# Patient Record
Sex: MALE | Race: WHITE | NOT HISPANIC OR LATINO | Employment: PART TIME | URBAN - METROPOLITAN AREA
[De-identification: names, ages, dates, MRNs, and addresses within clinical notes are randomized per-mention and may not be internally consistent; named-entity substitution may affect disease eponyms.]

---

## 2024-05-08 ENCOUNTER — OFFICE VISIT (OUTPATIENT)
Dept: DERMATOLOGY | Facility: IMAGING CENTER | Age: 29
End: 2024-05-08
Payer: COMMERCIAL

## 2024-05-08 DIAGNOSIS — Z79.899 HIGH RISK MEDICATION USE: ICD-10-CM

## 2024-05-08 DIAGNOSIS — L20.84 INTRINSIC ATOPIC DERMATITIS: ICD-10-CM

## 2024-05-08 PROCEDURE — 99204 OFFICE O/P NEW MOD 45 MIN: CPT | Performed by: NURSE PRACTITIONER

## 2024-05-08 RX ORDER — MONTELUKAST SODIUM 10 MG/1
TABLET ORAL
COMMUNITY
Start: 2024-04-16

## 2024-05-08 RX ORDER — TACROLIMUS 1 MG/G
OINTMENT TOPICAL
Qty: 100 G | Refills: 3 | Status: SHIPPED | OUTPATIENT
Start: 2024-05-08

## 2024-05-08 RX ORDER — TRIAMCINOLONE ACETONIDE 1 MG/G
CREAM TOPICAL
Qty: 454 G | Refills: 3 | Status: SHIPPED | OUTPATIENT
Start: 2024-05-08

## 2024-05-08 NOTE — PROGRESS NOTES
DERMATOLOGY NOTE  NEW VISIT       Chief complaint: Establish Care     HPI:Hx Eczema  Onset:   Symptoms: Redness,swelling, itchy  Aggravating factors: No  Alleviating factors: Had a shot of Kenalog at Urgent Care 03/20/2024  Treatments: Has had 3 Emergency Kenalog Shots in the last few years  Had a dermatologist before and just had topical treatments. Has had hydrocortisone shots on scalp.  Pt would like to get a better treatment to alleviate the symptoms  Haven't been on Biologics in the past, just steroid.        Not on File     MEDICATIONS:  Medications relevant to specialty reviewed.     REVIEW OF SYSTEMS:   Positive for skin (see HPI)  Negative for fevers and chills       EXAM:  There were no vitals taken for this visit.  Constitutional: Well-developed, well-nourished, and in no distress.     A focused skin exam was performed including the affected areas of the face, trunk, extremities. Notable findings on exam today listed below and/or in assessment/plan.     Scattered morbiliform papules and patches to all areas assessed with associated periorbital edema    IMPRESSION / PLAN:    1. Intrinsic atopic dermatitis, severe  Pt understands the course and nature of disease, has associated allergies and asthma  No significant improvement with TCS/non steroidal anti-inflammatories, currently on singulair and second generation antihistamine  Understands the importance of supportive measures, emollient use, dry skin handouts given  Has had to have IM kenalog in the past few months  Pt would benefit from biologic, PA started on Dupixent  Discussed with pt AEs, risks and benefits, labs ordered  May need to consider referral to allergist for testing  Rx's below  Close follow up 6 weeks    - triamcinolone acetonide (KENALOG) 0.1 % Cream; AAA twice a day for 1-2 weeks at a time with 1-2 week break in between  Dispense: 454 g; Refill: 3  - tacrolimus (PROTOPIC) 0.1 % Ointment; AAA twice a day  Dispense: 100 g; Refill: 3    2.  High risk medication use    - Quantiferon Gold TB (PPD); Future  - CBC WITH DIFFERENTIAL; Future      Discussed risks, benefits, alternative treatments as well as common side effects associated with prescribed treatment, Patient verbalized understanding and agrees with plan regarding the above        Please note that this dictation was created using voice recognition software. I have made every reasonable attempt to correct obvious errors, but I expect that there are errors of grammar and possibly content that I did not discover before finalizing the note.      Return to clinic in: Return in about 6 weeks (around 6/19/2024) for Eczema follow up. and as needed for any new or changing skin lesions.

## 2024-05-10 ENCOUNTER — HOSPITAL ENCOUNTER (OUTPATIENT)
Dept: LAB | Facility: MEDICAL CENTER | Age: 29
End: 2024-05-10
Attending: NURSE PRACTITIONER
Payer: COMMERCIAL

## 2024-05-10 ENCOUNTER — TELEPHONE (OUTPATIENT)
Dept: DERMATOLOGY | Facility: IMAGING CENTER | Age: 29
End: 2024-05-10
Payer: COMMERCIAL

## 2024-05-10 DIAGNOSIS — Z79.899 HIGH RISK MEDICATION USE: ICD-10-CM

## 2024-05-10 LAB
BASOPHILS # BLD AUTO: 1.2 % (ref 0–1.8)
BASOPHILS # BLD: 0.1 K/UL (ref 0–0.12)
EOSINOPHIL # BLD AUTO: 0.94 K/UL (ref 0–0.51)
EOSINOPHIL NFR BLD: 11.4 % (ref 0–6.9)
ERYTHROCYTE [DISTWIDTH] IN BLOOD BY AUTOMATED COUNT: 42.9 FL (ref 35.9–50)
HCT VFR BLD AUTO: 51.3 % (ref 42–52)
HGB BLD-MCNC: 17.2 G/DL (ref 14–18)
IMM GRANULOCYTES # BLD AUTO: 0.04 K/UL (ref 0–0.11)
IMM GRANULOCYTES NFR BLD AUTO: 0.5 % (ref 0–0.9)
LYMPHOCYTES # BLD AUTO: 2.13 K/UL (ref 1–4.8)
LYMPHOCYTES NFR BLD: 25.9 % (ref 22–41)
MCH RBC QN AUTO: 30.3 PG (ref 27–33)
MCHC RBC AUTO-ENTMCNC: 33.5 G/DL (ref 32.3–36.5)
MCV RBC AUTO: 90.3 FL (ref 81.4–97.8)
MONOCYTES # BLD AUTO: 0.78 K/UL (ref 0–0.85)
MONOCYTES NFR BLD AUTO: 9.5 % (ref 0–13.4)
NEUTROPHILS # BLD AUTO: 4.23 K/UL (ref 1.82–7.42)
NEUTROPHILS NFR BLD: 51.5 % (ref 44–72)
NRBC # BLD AUTO: 0 K/UL
NRBC BLD-RTO: 0 /100 WBC (ref 0–0.2)
PLATELET # BLD AUTO: 280 K/UL (ref 164–446)
PMV BLD AUTO: 9.7 FL (ref 9–12.9)
RBC # BLD AUTO: 5.68 M/UL (ref 4.7–6.1)
WBC # BLD AUTO: 8.2 K/UL (ref 4.8–10.8)

## 2024-05-10 NOTE — TELEPHONE ENCOUNTER
Prior authorization started through cover my meds on Tacrolimus .status pending awaiting response

## 2024-05-13 ENCOUNTER — TELEPHONE (OUTPATIENT)
Dept: DERMATOLOGY | Facility: IMAGING CENTER | Age: 29
End: 2024-05-13
Payer: COMMERCIAL

## 2024-05-13 LAB
GAMMA INTERFERON BACKGROUND BLD IA-ACNC: 0.03 IU/ML
M TB IFN-G BLD-IMP: NEGATIVE
M TB IFN-G CD4+ BCKGRND COR BLD-ACNC: 0 IU/ML
MITOGEN IGNF BCKGRD COR BLD-ACNC: >10 IU/ML
QFT TB2 - NIL TBQ2: 0 IU/ML

## 2024-05-13 NOTE — TELEPHONE ENCOUNTER
Prior authorization has been started for Dupixent through [facility] specialty pharmacy. Spoke to Farzaneh ZENG All clinical notes have been faxed . Status pending , awaiting response .  PA #  [id number]     Fax #  [phone]

## 2024-05-14 ENCOUNTER — TELEPHONE (OUTPATIENT)
Dept: DERMATOLOGY | Facility: IMAGING CENTER | Age: 29
End: 2024-05-14
Payer: COMMERCIAL

## 2024-05-14 DIAGNOSIS — L20.84 INTRINSIC ATOPIC DERMATITIS: ICD-10-CM

## 2024-05-14 NOTE — TELEPHONE ENCOUNTER
[facility] denied dupixent stating patient has not tried and failed very high potency corticosteroid on the skin in the past 180 days . I scanned insurance denial in media tab

## 2024-05-15 NOTE — TELEPHONE ENCOUNTER
Rx was denied , all information scanned in media tab. Priscila Luong and patient has been informed.

## 2024-05-15 NOTE — TELEPHONE ENCOUNTER
Left message with wife herrera , ok per patient as she is emergency contact . They will  new Rx and call us in about two weeks time to give an up date on how he is doing .

## 2024-05-28 DIAGNOSIS — J45.909 ASTHMA, UNSPECIFIED ASTHMA SEVERITY, UNSPECIFIED WHETHER COMPLICATED, UNSPECIFIED WHETHER PERSISTENT: ICD-10-CM

## 2024-05-28 RX ORDER — ALBUTEROL SULFATE 90 UG/1
1-2 AEROSOL, METERED RESPIRATORY (INHALATION) EVERY 6 HOURS PRN
Qty: 1 EACH | Refills: 2 | Status: SHIPPED | OUTPATIENT
Start: 2024-05-28

## 2024-06-19 ENCOUNTER — TELEPHONE (OUTPATIENT)
Dept: DERMATOLOGY | Facility: IMAGING CENTER | Age: 29
End: 2024-06-19

## 2024-06-19 ENCOUNTER — OFFICE VISIT (OUTPATIENT)
Dept: DERMATOLOGY | Facility: IMAGING CENTER | Age: 29
End: 2024-06-19
Payer: COMMERCIAL

## 2024-06-19 DIAGNOSIS — L20.84 INTRINSIC ATOPIC DERMATITIS: ICD-10-CM

## 2024-06-19 PROCEDURE — 99213 OFFICE O/P EST LOW 20 MIN: CPT | Performed by: NURSE PRACTITIONER

## 2024-06-19 RX ORDER — TACROLIMUS 1 MG/G
OINTMENT TOPICAL
Qty: 100 G | Refills: 3 | Status: SHIPPED | OUTPATIENT
Start: 2024-06-19

## 2024-06-19 NOTE — TELEPHONE ENCOUNTER
Pa initiated for Dupixent through CoverMyMeds / was sent to [facility]. Waiting for response.

## 2024-06-19 NOTE — PROGRESS NOTES
DERMATOLOGY NOTE  Follow up  VISIT       Chief complaint: Follow-Up       Discuss  starting Dupixent, still continues get break outs all over body, no significant improvement with fluocinonide, TAC, triamcinolone injections, max dose of antihistamines and montelukast       HPI:Hx Eczema  Onset:   Symptoms: Redness,swelling, itchy  Aggravating factors: No  Alleviating factors: Had a shot of Kenalog at Urgent Care 03/20/2024  Treatments: Has had 3 Emergency Kenalog Shots in the last few years  Had a dermatologist before and just had topical treatments. Has had hydrocortisone shots on scalp.  Pt would like to get a better treatment to alleviate the symptoms  Haven't been on Biologics in the past, just steroid.        Not on File     MEDICATIONS:  Medications relevant to specialty reviewed.     REVIEW OF SYSTEMS:   Positive for skin (see HPI)  Negative for fevers and chills       EXAM:  There were no vitals taken for this visit.  Constitutional: Well-developed, well-nourished, and in no distress.     A focused skin exam was performed including the affected areas of the face, trunk, extremities. Notable findings on exam today listed below and/or in assessment/plan.     Scattered morbiliform papules and patches to all areas assessed with associated periorbital edema    IMPRESSION / PLAN:    1. Intrinsic atopic dermatitis, severe  Again, pt understands the course and nature of disease, has associated allergies and asthma  No significant improvement with TCS/non steroidal anti-inflammatories--fluocinonide and TAC, montelukast and max doses of second generation antihistamine  Understands the importance of supportive measures, emollient use, dry skin handouts given  Has had to have IM kenalog in the past few months, no significant improvement  Will resubmit PA for Dupixent, advised to continue with current regimen until outcome of second PA  Rx below, will send to alternate pharmacy due to cost    - tacrolimus (PROTOPIC) 0.1  % Ointment; AAA twice a day  Dispense: 100 g; Refill: 3    Discussed risks, benefits, alternative treatments as well as common side effects associated with prescribed treatment, Patient verbalized understanding and agrees with plan regarding the above        Please note that this dictation was created using voice recognition software. I have made every reasonable attempt to correct obvious errors, but I expect that there are errors of grammar and possibly content that I did not discover before finalizing the note.      Return to clinic in: Return for pending re-submission of PA for dupixent. and as needed for any new or changing skin lesions.

## 2024-06-21 NOTE — TELEPHONE ENCOUNTER
PA for Dupixent approved. Spoked to Vida from PA Department and she stated that both automated pen and pre-filled syringe are approved. Letter scanned in media.

## 2024-06-24 DIAGNOSIS — L20.84 INTRINSIC ATOPIC DERMATITIS: ICD-10-CM

## 2024-06-25 DIAGNOSIS — L20.84 INTRINSIC ATOPIC DERMATITIS: ICD-10-CM

## 2024-08-09 DIAGNOSIS — J45.909 ASTHMA, UNSPECIFIED ASTHMA SEVERITY, UNSPECIFIED WHETHER COMPLICATED, UNSPECIFIED WHETHER PERSISTENT: ICD-10-CM

## 2024-08-09 NOTE — TELEPHONE ENCOUNTER
ALBUTEROL HFA (PROVENTIL) INH   Received request via: Pharmacy    Was the patient seen in the last year in this department? Yes    Does the patient have an active prescription (recently filled or refills available) for medication(s) requested? No    Pharmacy Name: [facility]/pharmacy #[id number] - [place], [place] - [address] in [place]     Does the patient have shelter Plus and need 100-day supply? (This applies to ALL medications) Patient does not have SCP

## 2024-08-13 RX ORDER — ALBUTEROL SULFATE 90 UG/1
AEROSOL, METERED RESPIRATORY (INHALATION)
Qty: 6.7 EACH | Refills: 2 | Status: SHIPPED | OUTPATIENT
Start: 2024-08-13

## 2024-09-14 ENCOUNTER — OFFICE VISIT (OUTPATIENT)
Dept: URGENT CARE | Facility: CLINIC | Age: 29
End: 2024-09-14
Payer: COMMERCIAL

## 2024-09-14 ENCOUNTER — APPOINTMENT (OUTPATIENT)
Dept: RADIOLOGY | Facility: IMAGING CENTER | Age: 29
End: 2024-09-14
Payer: COMMERCIAL

## 2024-09-14 VITALS
TEMPERATURE: 98.3 F | OXYGEN SATURATION: 94 % | HEART RATE: 88 BPM | DIASTOLIC BLOOD PRESSURE: 70 MMHG | WEIGHT: 200.3 LBS | SYSTOLIC BLOOD PRESSURE: 108 MMHG | HEIGHT: 70 IN | BODY MASS INDEX: 28.67 KG/M2 | RESPIRATION RATE: 20 BRPM

## 2024-09-14 DIAGNOSIS — J45.41 MODERATE PERSISTENT ASTHMA WITH ACUTE EXACERBATION: Primary | ICD-10-CM

## 2024-09-14 DIAGNOSIS — Z00.00 HEALTHCARE MAINTENANCE: ICD-10-CM

## 2024-09-14 DIAGNOSIS — J06.9 VIRAL URI WITH COUGH: ICD-10-CM

## 2024-09-14 LAB
FLUAV RNA SPEC QL NAA+PROBE: NEGATIVE
FLUBV RNA SPEC QL NAA+PROBE: NEGATIVE
RSV RNA SPEC QL NAA+PROBE: NEGATIVE
SARS-COV-2 RNA RESP QL NAA+PROBE: NEGATIVE

## 2024-09-14 PROCEDURE — 94640 AIRWAY INHALATION TREATMENT: CPT

## 2024-09-14 PROCEDURE — 3074F SYST BP LT 130 MM HG: CPT

## 2024-09-14 PROCEDURE — 71046 X-RAY EXAM CHEST 2 VIEWS: CPT | Mod: TC

## 2024-09-14 PROCEDURE — 0241U POCT CEPHEID COV-2, FLU A/B, RSV - PCR: CPT

## 2024-09-14 PROCEDURE — 3078F DIAST BP <80 MM HG: CPT

## 2024-09-14 PROCEDURE — 99417 PROLNG OP E/M EACH 15 MIN: CPT | Mod: 25

## 2024-09-14 PROCEDURE — 99215 OFFICE O/P EST HI 40 MIN: CPT | Mod: 25

## 2024-09-14 RX ORDER — ALBUTEROL SULFATE 90 UG/1
2 INHALANT RESPIRATORY (INHALATION) EVERY 6 HOURS PRN
Qty: 8.5 G | Refills: 0 | Status: SHIPPED | OUTPATIENT
Start: 2024-09-14

## 2024-09-14 RX ORDER — PREDNISONE 10 MG/1
40 TABLET ORAL DAILY
Qty: 20 TABLET | Refills: 0 | Status: SHIPPED | OUTPATIENT
Start: 2024-09-14 | End: 2024-09-19

## 2024-09-14 RX ORDER — MONTELUKAST SODIUM 10 MG/1
10 TABLET ORAL DAILY
Qty: 30 TABLET | Refills: 1 | Status: SHIPPED | OUTPATIENT
Start: 2024-09-14

## 2024-09-14 RX ORDER — IPRATROPIUM BROMIDE AND ALBUTEROL SULFATE 2.5; .5 MG/3ML; MG/3ML
3 SOLUTION RESPIRATORY (INHALATION) ONCE
Status: COMPLETED | OUTPATIENT
Start: 2024-09-14 | End: 2024-09-14

## 2024-09-14 RX ORDER — FLUTICASONE PROPIONATE AND SALMETEROL 100; 50 UG/1; UG/1
1 POWDER RESPIRATORY (INHALATION) EVERY 12 HOURS
Qty: 1 EACH | Refills: 2 | Status: SHIPPED | OUTPATIENT
Start: 2024-09-14

## 2024-09-14 RX ADMIN — IPRATROPIUM BROMIDE AND ALBUTEROL SULFATE 3 ML: 2.5; .5 SOLUTION RESPIRATORY (INHALATION) at 09:24

## 2024-09-14 ASSESSMENT — ENCOUNTER SYMPTOMS
SPUTUM PRODUCTION: 0
WHEEZING: 1
COUGH: 1
HEMOPTYSIS: 0
SORE THROAT: 0
SHORTNESS OF BREATH: 1
FEVER: 0
RHINORRHEA: 1

## 2024-09-14 NOTE — PROGRESS NOTES
Subjective:   Shakir Mohan is a very pleasant 28 y.o. male who presents for:    Chief Complaint   Patient presents with    Shortness of Breath     X2days runny nose/cough/headache/for the last 14 hrs having shortness of breath        HPI:    Shortness of Breath  Episode onset: the patient reports that he began to developed cough, congestion, and cough. Associated symptoms include rhinorrhea and wheezing. Pertinent negatives include no fever, hemoptysis, sore throat or sputum production. The patient has no known risk factors for DVT/PE. He has tried beta agonist inhalers and OTC inhalers (The patient is using his albuterol inhaler at least 5 times a day) for the symptoms. The treatment provided mild relief. His past medical history is significant for asthma and pneumonia.       ROS:    Review of Systems   Constitutional:  Negative for fever.   HENT:  Positive for congestion and rhinorrhea. Negative for sore throat.    Respiratory:  Positive for cough, shortness of breath and wheezing. Negative for hemoptysis and sputum production.    All other systems reviewed and are negative.      Medications:      Current Outpatient Medications   Medication Sig    predniSONE (DELTASONE) 10 MG Tab Take 4 Tablets by mouth every day for 5 days.    albuterol 108 (90 Base) MCG/ACT Aero Soln inhalation aerosol Inhale 2 Puffs every 6 hours as needed for Shortness of Breath.    montelukast (SINGULAIR) 10 MG Tab Take 1 Tablet by mouth every day.    fluticasone-salmeterol (ADVAIR) 100-50 MCG/ACT AEROSOL POWDER, BREATH ACTIVATED Inhale 1 Puff every 12 hours.    tacrolimus (PROTOPIC) 0.1 % Ointment AAA twice a day    fluocinonide (LIDEX) 0.05 % Cream AAA, except face, twice a day for 2-3 weeks at a time with 1-2 week break in between    triamcinolone acetonide (KENALOG) 0.1 % Cream AAA twice a day for 1-2 weeks at a time with 1-2 week break in between    dupilumab (DUPIXENT) 300 MG/2ML injection Maintenance dose, 300 mg SQ every 2 weeks  (Patient not taking: Reported on 9/14/2024)       Allergies:     No Known Allergies    Problem List:     There is no problem list on file for this patient.      Surgical History:    No past surgical history on file.    Past Social Hx:     Social History     Socioeconomic History    Marital status: Other   Tobacco Use    Smoking status: Never    Smokeless tobacco: Never   Vaping Use    Vaping status: Never Used   Substance and Sexual Activity    Alcohol use: Not Currently    Drug use: Yes     Types: Marijuana        Past Family Hx:      History reviewed. No pertinent family history.    Problem list, medications, and allergies reviewed by myself today in Epic.     Objective:     Vitals:    09/14/24 0842   BP: 108/70   Pulse: 88   Resp: 20   Temp: 36.8 °C (98.3 °F)   SpO2: 94%       Physical Exam  Vitals reviewed.   Constitutional:       General: He is not in acute distress.     Appearance: Normal appearance. He is normal weight. He is not ill-appearing, toxic-appearing or diaphoretic.   HENT:      Head: Normocephalic and atraumatic.      Right Ear: Tympanic membrane, ear canal and external ear normal.      Left Ear: Tympanic membrane, ear canal and external ear normal.      Nose: Congestion and rhinorrhea present.      Mouth/Throat:      Mouth: Mucous membranes are moist.      Pharynx: Oropharynx is clear.   Eyes:      Extraocular Movements: Extraocular movements intact.      Conjunctiva/sclera: Conjunctivae normal.      Pupils: Pupils are equal, round, and reactive to light.   Cardiovascular:      Rate and Rhythm: Normal rate and regular rhythm.      Chest Wall: PMI is not displaced. No thrill.      Pulses: Normal pulses. No decreased pulses.      Heart sounds: Normal heart sounds. Heart sounds not distant.      No systolic murmur is present.      No diastolic murmur is present.      No S3 or S4 sounds.   Pulmonary:      Effort: Tachypnea and accessory muscle usage present. No respiratory distress.      Breath  sounds: No stridor. Examination of the right-upper field reveals wheezing. Examination of the left-upper field reveals wheezing. Examination of the right-middle field reveals wheezing. Examination of the right-lower field reveals wheezing. Examination of the left-lower field reveals wheezing. Wheezing present. No rhonchi or rales.      Comments: Increased WOB  Abdominal:      General: Abdomen is flat. Bowel sounds are normal.      Palpations: Abdomen is soft.   Musculoskeletal:         General: Normal range of motion.      Cervical back: Normal range of motion and neck supple. No rigidity or tenderness.      Right lower leg: No edema.      Left lower leg: No edema.   Lymphadenopathy:      Cervical: No cervical adenopathy.   Skin:     General: Skin is warm and dry.   Neurological:      General: No focal deficit present.      Mental Status: He is alert and oriented to person, place, and time. Mental status is at baseline.   Psychiatric:         Mood and Affect: Mood normal.         Behavior: Behavior normal.         Thought Content: Thought content normal.         Judgment: Judgment normal.       Results from POCT:   Results for orders placed or performed in visit on 09/14/24   POCT Cepheid CoV-2, Flu A/B, RSV - PCR   Result Value Ref Range    SARS-CoV-2 by PCR Negative Negative, Invalid    Influenza virus A RNA Negative Negative, Invalid    Influenza virus B, PCR Negative Negative, Invalid    RSV, PCR Negative Negative, Invalid     X-ray images viewed and interpreted by APRN, confirmed by radiology:        RADIOLOGY RESULTS   DX-CHEST-2 VIEWS    Result Date: 9/14/2024 9/14/2024 9:40 AM HISTORY/REASON FOR EXAM:  Shortness of breath TECHNIQUE/EXAM DESCRIPTION AND NUMBER OF VIEWS: Two views of the chest. COMPARISON:  None. FINDINGS: LUNGS: Clear. No effusions. PNEUMOTHORAX: None. LINES AND TUBES: None. MEDIASTINUM: No cardiomegaly. MUSCULOSKELETAL STRUCTURES: No acute displaced fracture.     No acute cardiopulmonary  abnormality.           Assessment/Plan:     Diagnosis and associated orders:     1. Moderate persistent asthma with acute exacerbation  - DX-CHEST-2 VIEWS  - POCT Cepheid CoV-2, Flu A/B, RSV - PCR  - ipratropium-albuterol (DUONEB) nebulizer solution  - predniSONE (DELTASONE) 10 MG Tab; Take 4 Tablets by mouth every day for 5 days.  Dispense: 20 Tablet; Refill: 0  - albuterol 108 (90 Base) MCG/ACT Aero Soln inhalation aerosol; Inhale 2 Puffs every 6 hours as needed for Shortness of Breath.  Dispense: 8.5 g; Refill: 0  - montelukast (SINGULAIR) 10 MG Tab; Take 1 Tablet by mouth every day.  Dispense: 30 Tablet; Refill: 1  - Referral to Pulmonary and Sleep Medicine  -  AM/Refusal of Treatment  - fluticasone-salmeterol (ADVAIR) 100-50 MCG/ACT AEROSOL POWDER, BREATH ACTIVATED; Inhale 1 Puff every 12 hours.  Dispense: 1 Each; Refill: 2    2. Viral URI with cough  - POCT Cepheid CoV-2, Flu A/B, RSV - PCR    3. Healthcare maintenance  - Referral to establish with PCP          Comments/MDM:     The patient presents to the urgent care for evaluation of a viral URI and asthma exacerbation.  When the patient arrived in clinic, he was experiencing tachypnea, tachycardia, and increased work of breathing.  His oxygen saturation was ranging between 89 and 94% on room air.  A DuoNeb was given, which significantly improved his wheezing and work of breathing.  He was kept on an oxygen monitor for approximately 90 minutes while in the urgent care to determine whether or not his oxygen saturation would remain below 92%.  After the administration of a DuoNeb and throughout the duration of his urgent care visit, his oxygen saturation ranged between 91% and 95% on room air.  His work of breathing decreased.  A chest x-ray was performed, which demonstrated no acute cardiopulmonary abnormality.  Viral testing negative.  Based on the patient's symptoms, symptom duration, and physical exam findings, it is highly likely that he is suffering  from a viral URI, which caused him to have an asthma exacerbation.  Unfortunately, he does not have a primary care provider or a pulmonologist that monitors his asthma.  He has been using his albuterol inhaler approximately 5-6 times per day over the last 48 hours.  His significant other is with him and endorses that his asthma symptoms have been worsening.  I engaged in a comprehensive conversation with the patient regarding his prognosis.  Due to the severity of his exacerbation, I highly recommended that he be seen in the ED for oxygen monitoring and breathing treatments.  The patient verbalized understanding and declined transfer to the ED.  AMA paperwork was filled out.  He would like to have his conditions managed outpatient.  Prescription for prednisone burst was sent to patient's preferred pharmacy.  Albuterol inhaler was refilled.  He reports that he had previously been on montelukast for the management of chronic eczema.  He would like to restart this medication as he felt that this was helpful in controlling his skin and asthma symptoms.    Based on [place] guidelines, the patient has moderate persistent asthma.  He reports that he has nighttime awakenings greater than 5 days out of the month.  A prescription for Advair was sent to the patient's preferred pharmacy.  A referral was placed to pulmonology for management of asthma  For overall health care maintenance, a referral to primary care was placed  The patient and his partner were advised on strict ER precautions in the event that he develops worsening symptoms.         All questions answered. Patient verbalized understanding and is in agreement with this plan of care.     If symptoms are worsening or not improving in 3-5 days, follow-up with PCP or return to UC. Differential diagnosis, natural history, and supportive care discussed. AVS handout given and reviewed with patient. Patient educated on red flags and when to seek treatment back in ED or UC.      I personally reviewed prior external notes and test results pertinent to today's visit.  I have independently reviewed and interpreted all diagnostics ordered during this urgent care visit.     Time I spent evaluating the patient in urgent care today was 90 minutes. This time includes preparing for visit, reviewing any pertinent notes or test results, monitoring oxygen saturation for approximately 60 minutes, counseling/education, exam, obtaining HPI, interpretation of lab tests, medication management and documentation as indicated above. Time does not include separately billable procedures noted.      This dictation has been created using voice recognition software. The accuracy of the dictation is limited by the abilities of the software. I expect there may be some errors of grammar and possibly content. I made every attempt to manually correct the errors within my dictation. However, errors related to voice recognition software may still exist and should be interpreted within the appropriate context.    This note was electronically signed by ZION Black

## 2024-09-14 NOTE — PATIENT INSTRUCTIONS
"As we discussed your clinical condition would benefit from over-the-counter remedies:    Congestion:    Nasal rinsing with saline nasal spray or salt water (e.g., neti pot) can help relieve nasal dryness.    Breathe Right nasal strips at night for nasal congestion    Ponaris nasal emmollient for nasal congestion, dryness, and inflammation (do not use with iodine sensitivity)    Cool mist humidification, chest rubs, warm tea with honey, increased fluid intake to thin secretions    SALINE IRRIGATION/SPRAY 2 to 3 times per day    You may consider using a saline nasal irrigation (such as a \"Neti pot\" or similar device using sterile water, NOT tap water) or OTC saline nasal spray. Common brands include Matt Med, Sinex, Revloc Nasal. May use short term nasal sprays, such as oxymetazoline (Afrin) to help relieve nasal discomfort, congestion, and/or pressure. Decongestant sprays should not be used longer than three consecutive days.     Over the counter medications:    OTC second generation antihistamine daily (cetirizine, desloratadine, fexofenadine, levocetirizine, and loratadine) daily IN COMBINATION WITH:    FLONASE (once per day) x 2 weeks     You may consider intranasal steroids such as fluticasone (brand name Flonase), (other options include Nasonex, Rhinocort, Nasacort) daily; continue for at least 2-3 weeks. Any generic should work as well but may cause slightly more nasal irritation. Please take according to package directions.  This steroid will help reduce inflammation of your sinuses.  This is the number one medication to help with seasonal allergies and treat nasal inflammation.  Cost: around $8 at [facility] for the generic fluticasone [facility] product (as of 5/20).    SUDAFED (low dose to see if tolerated) daily x 4-5 days    You may consider over-the-counter Sudafed (pseudoephedrine) to act as a decongestant to improve your breathing through your nose.  Please do not use this medication if you already have known " high blood pressure.  Please take according to package directions and note that this has a stimulating effect and should not be taken late in the day.  There is a low dose 12 hour formulation and a higher dose 24 hour formulation.  Start with a low dose to make sure you tolerate the medication.  Do not take late in the day as it may interfere with sleep.   Cost: Around $6 for the generic [facility] branded product at a 10mg dose (as of 2/2023).      SORE THROAT:    Symptom management for a sore throat includes:     Sipping cold or warm beverages (eg, tea with honey or lemon)     Eating cold or frozen desserts (eg, ice cream, popsicles)    Sucking on ice    Sucking on hard candy, CEPACOL lozenges, or medicated throat sprays. These contain the active ingredient benzocaine which is an anesthetic agent.  It helps relieve the symptoms of sore throat and may diminish your cough reflex.Please note that taking too frequently may cause harm - pay attention to package directions.    Gargling with warm salt water -  ¼ to ½ teaspoon of salt per 8 ounces (approximately 240 mL) of warm water.  Cool mist humidification  OTC Tylenol/ibuprofen PRN for pain/fever     PAIN OR FEVER:    NSAIDs  You may take Ibuprofen (brand name Motrin or Advil) 600 to 800 mg may be taken up to three times per day taken with food (do not exceed 2400 mg per day).  If you prefer you may consider Naproxen (brand name Naprosyn or Aleve) around 500 mg twice per day with food (do not exceed 1200 mg per day). Please do not take if you have known stomach ulcers or known kidney disease.     TYLENOL  You may take Tylenol according to package directions (1000 mg every 8 hours not to exceed 3000 mg per day).  Please do not consume with any alcohol products, or if you have known or suspected liver disease. These will help reduce inflammation, help with pain control, and can reduce fevers.

## 2024-10-02 ENCOUNTER — TELEPHONE (OUTPATIENT)
Dept: DERMATOLOGY | Facility: IMAGING CENTER | Age: 29
End: 2024-10-02
Payer: COMMERCIAL

## 2024-12-22 DIAGNOSIS — J45.41 MODERATE PERSISTENT ASTHMA WITH ACUTE EXACERBATION: ICD-10-CM

## 2024-12-28 RX ORDER — ALBUTEROL SULFATE 90 UG/1
INHALANT RESPIRATORY (INHALATION)
Qty: 6.7 EACH | Refills: 1 | Status: SHIPPED | OUTPATIENT
Start: 2024-12-28

## 2025-05-27 DIAGNOSIS — L20.84 INTRINSIC ATOPIC DERMATITIS: ICD-10-CM

## 2025-05-27 RX ORDER — TRIAMCINOLONE ACETONIDE 1 MG/G
CREAM TOPICAL
Qty: 120 G | Refills: 1 | Status: SHIPPED | OUTPATIENT
Start: 2025-05-27